# Patient Record
Sex: FEMALE | Race: WHITE | NOT HISPANIC OR LATINO | Employment: OTHER | URBAN - NONMETROPOLITAN AREA
[De-identification: names, ages, dates, MRNs, and addresses within clinical notes are randomized per-mention and may not be internally consistent; named-entity substitution may affect disease eponyms.]

---

## 2017-01-18 ENCOUNTER — OFFICE VISIT (OUTPATIENT)
Dept: GASTROENTEROLOGY | Facility: CLINIC | Age: 62
End: 2017-01-18

## 2017-01-18 VITALS
WEIGHT: 149 LBS | BODY MASS INDEX: 23.39 KG/M2 | DIASTOLIC BLOOD PRESSURE: 67 MMHG | HEIGHT: 67 IN | HEART RATE: 51 BPM | SYSTOLIC BLOOD PRESSURE: 108 MMHG

## 2017-01-18 DIAGNOSIS — K50.00 CROHN'S DISEASE OF SMALL INTESTINE WITHOUT COMPLICATION (HCC): Primary | ICD-10-CM

## 2017-01-18 DIAGNOSIS — Z12.11 ENCOUNTER FOR SCREENING FOR MALIGNANT NEOPLASM OF COLON: ICD-10-CM

## 2017-01-18 PROCEDURE — 99212 OFFICE O/P EST SF 10 MIN: CPT | Performed by: INTERNAL MEDICINE

## 2017-01-18 RX ORDER — GUAIFENESIN 600 MG/1
1200 TABLET, EXTENDED RELEASE ORAL
COMMUNITY
Start: 2016-12-23 | End: 2017-12-24

## 2017-01-18 RX ORDER — OMEPRAZOLE 40 MG/1
40 CAPSULE, DELAYED RELEASE ORAL
COMMUNITY

## 2017-01-18 RX ORDER — CETIRIZINE HYDROCHLORIDE 10 MG/1
TABLET ORAL
Refills: 3 | COMMUNITY
Start: 2016-12-27

## 2017-01-18 RX ORDER — ESTRADIOL AND NORETHINDRONE ACETATE 1; .5 MG/1; MG/1
1 TABLET ORAL
COMMUNITY

## 2017-01-18 RX ORDER — SOLIFENACIN SUCCINATE 5 MG/1
5 TABLET, FILM COATED ORAL
COMMUNITY

## 2017-01-18 NOTE — MR AVS SNAPSHOT
Darcie Booth Stepan   1/18/2017 10:15 AM   Office Visit    Dept Phone:  [phone]   Encounter #:  [id number]    Provider:  David Clark MD   Department:  [facility]                Your Full Care Plan              Your Updated Medication List          This list is accurate as of: 1/18/17 11:10 AM.  Always use your most recent med list.                azelastine 0.15 % solution nasal spray   Commonly known as:  ASTEPRO       buPROPion 100 MG tablet   Commonly known as:  WELLBUTRIN   Take 1 tablet by mouth 2 (Two) Times a Day.       calcium-vitamin D 500-200 MG-UNIT per tablet   Commonly known as:  OSCAL-500       cetirizine 10 MG tablet   Commonly known as:  zyrTEC       chlorpheniramine 4 MG tablet   Commonly known as:  CHLOR-TRIMETON       CLARITIN PO       estradiol-norethindrone 1-0.5 MG per tablet   Commonly known as:  ACTIVELLA       FOSAMAX 70 MG tablet   Generic drug:  alendronate       guaiFENesin 600 MG 12 hr tablet   Commonly known as:  MUCINEX       HYDROcodone-homatropine 5-1.5 MG/5ML syrup   Commonly known as:  HYCODAN       hydrOXYzine 25 MG tablet   Commonly known as:  ATARAX       omeprazole 40 MG capsule   Commonly known as:  priLOSEC       solifenacin 5 MG tablet   Commonly known as:  VESICARE               You Were Diagnosed With        Codes Comments    Encounter for screening for malignant neoplasm of colon    -  Primary ICD-10-CM: Z12.11  ICD-9-CM: V76.51       Instructions     None    Patient Instructions History      Upcoming Appointments     Visit Type Date Time Department    OFFICE VISIT 1/18/2017 10:15 AM [facility]  [facility]    [facility]  SCREEN BILAT 2/20/2017  9:00 AM [facility]      MyChart Signup     [facility] [email or web address] allows you to send messages to your doctor, view your test results, renew your prescriptions, schedule appointments, and more. To sign up, go to [employer] and click on the  "Sign Up Now link in the New User? box. Enter your [employer] Activation Code exactly as it appears below along with the last four digits of your Social Security Number and your Date of Birth () to complete the sign-up process. If you do not sign up before the expiration date, you must request a new code.    d[employer] Activation Code: [email or web address]  Expires: 2017 11:10 AM    If you have questions, you can email [email or web address] or call [phone] to talk to our [employer] staff. Remember, [employer] is NOT to be used for urgent needs. For medical emergencies, dial 911.               Other Info from Your Visit           Your Appointments     2017  9:00 AM CST   Mammo mad screen bilat with [facility] 1   [facility] ([place])    [address] [place] [address]   [phone]           Arrive 15 minutes prior to appointment time.              Allergies     Sulfa Antibiotics        Reason for Visit     Gastroesophageal Reflux Disease Without Esophagitis     Crohn's Disease           Vital Signs     Blood Pressure Pulse Height Weight Body Mass Index Smoking Status    108/67 (BP Location: Left arm, Patient Position: Sitting, Cuff Size: Adult) 51 67\" (170.2 cm) 149 lb (67.6 kg) 23.34 kg/m2 Former Smoker      Problems and Diagnoses Noted     Screen for colon cancer    -  Primary        "

## 2017-01-20 ENCOUNTER — TRANSCRIBE ORDERS (OUTPATIENT)
Dept: MAMMOGRAPHY | Facility: CLINIC | Age: 62
End: 2017-01-20

## 2017-01-20 DIAGNOSIS — Z12.31 VISIT FOR SCREENING MAMMOGRAM: Primary | ICD-10-CM

## 2017-02-07 RX ORDER — GLYCOPYRROLATE 1 MG/1
1 TABLET ORAL 2 TIMES DAILY
COMMUNITY

## 2017-02-16 ENCOUNTER — TELEPHONE (OUTPATIENT)
Dept: GASTROENTEROLOGY | Facility: CLINIC | Age: 62
End: 2017-02-16

## 2017-02-16 NOTE — TELEPHONE ENCOUNTER
02/16/2017, 1208 - Patient telephoned per this staff member [phone].  Zero answer.  Voice message submitted with date, time, office contact information, notification of Prep-O-Pik Bowel Preparation e-scribed to patient's pharmacy of choice, [place], [place], submission of instructions for usage of Prep-O-Pik via facsimile to [facility], and reminder of Colonoscopy date/time - Thursday, March 9, 2017 at 7:30 a.m.

## 2017-02-16 NOTE — TELEPHONE ENCOUNTER
----- Message from Halley Olson sent at 2/14/2017  9:43 AM CST -----  PT COULD NOT KEEP SUPREP DOWN PLEASE SEND SOMETHING ELSE TO CLINIC PHARMACY

## 2017-03-09 ENCOUNTER — ANESTHESIA (OUTPATIENT)
Dept: GASTROENTEROLOGY | Facility: HOSPITAL | Age: 62
End: 2017-03-09

## 2017-03-09 ENCOUNTER — ANESTHESIA EVENT (OUTPATIENT)
Dept: GASTROENTEROLOGY | Facility: HOSPITAL | Age: 62
End: 2017-03-09

## 2017-03-09 ENCOUNTER — HOSPITAL ENCOUNTER (OUTPATIENT)
Facility: HOSPITAL | Age: 62
Setting detail: HOSPITAL OUTPATIENT SURGERY
Discharge: HOME | End: 2017-03-09
Attending: INTERNAL MEDICINE | Admitting: INTERNAL MEDICINE

## 2017-03-09 VITALS
WEIGHT: 143 LBS | HEART RATE: 51 BPM | TEMPERATURE: 97.4 F | OXYGEN SATURATION: 96 % | HEIGHT: 67 IN | DIASTOLIC BLOOD PRESSURE: 56 MMHG | RESPIRATION RATE: 18 BRPM | BODY MASS INDEX: 22.44 KG/M2 | SYSTOLIC BLOOD PRESSURE: 103 MMHG

## 2017-03-09 PROCEDURE — 45378 DIAGNOSTIC COLONOSCOPY: CPT | Performed by: INTERNAL MEDICINE

## 2017-03-09 PROCEDURE — 25010000002 FENTANYL CITRATE (PF) 100 MCG/2ML SOLUTION: Performed by: NURSE ANESTHETIST, CERTIFIED REGISTERED

## 2017-03-09 PROCEDURE — 25010000002 PROPOFOL 10 MG/ML EMULSION: Performed by: NURSE ANESTHETIST, CERTIFIED REGISTERED

## 2017-03-09 PROCEDURE — 25010000002 MIDAZOLAM PER 1 MG: Performed by: NURSE ANESTHETIST, CERTIFIED REGISTERED

## 2017-03-09 RX ORDER — FENTANYL CITRATE 50 UG/ML
INJECTION, SOLUTION INTRAMUSCULAR; INTRAVENOUS AS NEEDED
Status: DISCONTINUED | OUTPATIENT
Start: 2017-03-09 | End: 2017-03-09 | Stop reason: SURG

## 2017-03-09 RX ORDER — DEXTROSE AND SODIUM CHLORIDE 5; .45 G/100ML; G/100ML
20 INJECTION, SOLUTION INTRAVENOUS CONTINUOUS
Status: DISCONTINUED | OUTPATIENT
Start: 2017-03-09 | End: 2017-03-09 | Stop reason: HOSPADM

## 2017-03-09 RX ORDER — PROPOFOL 10 MG/ML
VIAL (ML) INTRAVENOUS AS NEEDED
Status: DISCONTINUED | OUTPATIENT
Start: 2017-03-09 | End: 2017-03-09 | Stop reason: SURG

## 2017-03-09 RX ORDER — MIDAZOLAM HYDROCHLORIDE 1 MG/ML
INJECTION INTRAMUSCULAR; INTRAVENOUS AS NEEDED
Status: DISCONTINUED | OUTPATIENT
Start: 2017-03-09 | End: 2017-03-09 | Stop reason: SURG

## 2017-03-09 RX ADMIN — DEXTROSE AND SODIUM CHLORIDE 20 ML/HR: 5; 450 INJECTION, SOLUTION INTRAVENOUS at 07:47

## 2017-03-09 RX ADMIN — MIDAZOLAM 2 MG: 1 INJECTION INTRAMUSCULAR; INTRAVENOUS at 08:53

## 2017-03-09 RX ADMIN — FENTANYL CITRATE 100 MCG: 50 INJECTION, SOLUTION INTRAMUSCULAR; INTRAVENOUS at 08:53

## 2017-03-09 RX ADMIN — PROPOFOL 30 MG: 10 INJECTION, EMULSION INTRAVENOUS at 09:02

## 2017-03-09 RX ADMIN — PROPOFOL 30 MG: 10 INJECTION, EMULSION INTRAVENOUS at 08:55

## 2017-03-09 NOTE — ADDENDUM NOTE
Addendum  created 03/09/17 0915 by Arlene Kebede, CRNA    Anesthesia Event edited, Anesthesia Intra Flowsheets edited, Anesthesia Intra Meds edited, Anesthesia Review and Sign - Ready for Procedure, Anesthesia Review and Sign - Signed, Anesthesia Staff edited, Flowsheet data copied forward, Patient device added, Patient device removed, Procedure Event Log accessed, Sign clinical note

## 2017-03-09 NOTE — ANESTHESIA POSTPROCEDURE EVALUATION
Patient: Darcie Ying    Procedure Summary     Date Anesthesia Start Anesthesia Stop Room / Location    03/09/17 0852 0913 [facility] ENDOSCOPY 1 / [facility] ENDOSCOPY       Procedure Diagnosis Surgeon Provider    COLONOSCOPY (N/A ) Colon cancer screening  (SCREENING) MD Arlene Rojas CRNA          Anesthesia Type: MAC  Last vitals  BP      Temp      Pulse     Resp      SpO2        Post Anesthesia Care and Evaluation    Patient location during evaluation: bedside  Patient participation: complete - patient participated  Level of consciousness: awake and awake and alert  Pain management: adequate  Airway patency: patent  Anesthetic complications: No anesthetic complications  PONV Status: none  Cardiovascular status: acceptable  Respiratory status: acceptable  Hydration status: acceptable

## 2017-03-09 NOTE — ANESTHESIA PREPROCEDURE EVALUATION
Anesthesia Evaluation     Patient summary reviewed and Nursing notes reviewed      Airway   Mallampati: II  TM distance: >3 FB  Neck ROM: full  possible difficult intubation  Dental - normal exam     Pulmonary - normal exam   (+) pneumonia (Hospitalized with PNE in 2012, sees pulmonologist every 6 months, everything is good at this time) resolved ,   (-) COPD  Cardiovascular - negative cardio ROS and normal exam        Neuro/Psych- negative ROS  (-) numbness  GI/Hepatic/Renal/Endo    (+)  GERD well controlled,     ROS Comment: Chron's Disease    Musculoskeletal (-) negative ROS    Abdominal  - normal exam   Substance History - negative use     OB/GYN negative ob/gyn ROS   (-)  Pregnant        Other - negative ROS                                   Anesthesia Plan    ASA 2     MAC     intravenous induction   Anesthetic plan and risks discussed with patient.

## 2017-09-06 ENCOUNTER — OFFICE VISIT (OUTPATIENT)
Dept: GASTROENTEROLOGY | Facility: CLINIC | Age: 62
End: 2017-09-06

## 2017-09-06 VITALS
SYSTOLIC BLOOD PRESSURE: 110 MMHG | WEIGHT: 145.9 LBS | DIASTOLIC BLOOD PRESSURE: 70 MMHG | HEART RATE: 48 BPM | BODY MASS INDEX: 22.9 KG/M2 | HEIGHT: 67 IN

## 2017-09-06 DIAGNOSIS — K50.00 CROHN'S DISEASE OF SMALL INTESTINE WITHOUT COMPLICATION (HCC): Primary | ICD-10-CM

## 2017-09-06 DIAGNOSIS — K21.9 GASTROESOPHAGEAL REFLUX DISEASE WITHOUT ESOPHAGITIS: ICD-10-CM

## 2017-09-06 PROCEDURE — 99212 OFFICE O/P EST SF 10 MIN: CPT | Performed by: INTERNAL MEDICINE

## 2017-09-09 NOTE — PROGRESS NOTES
Chief Complaint   Patient presents with   • Crohn's Disease Of Small And Large Intestine Without Complic     Colonoscopy performed 03/09/2017        Darcie Ying is a  62 y.o. female here for a follow up visit for Crohn's disease, GERD    HPI :  The patient returns for follow-up.  Presently she is doing well from a GI standpoint.  Her bowel movements are generally once daily and are formed.  She denies rectal bleeding.  She has had no recent symptoms of iritis or skin manifestations.  Her last colonoscopy was done in March of this year that showed no evidence of active disease.    Reflux symptoms are currently controlled with use of omeprazole 40 mg daily.  Denies dysphagia.  Weight is stable.  The patient is aware of my pending skilled nursing.  She plans follow-up with Dr. Ratliff at the [facility] in [facility].    Past Medical History:   Diagnosis Date   • BOOP (bronchiolitis obliterans with organizing pneumonia)    • Carpal tunnel syndrome    • Crohn's disease    • Gastroesophageal reflux disease    • Long-term drug therapy        Current Outpatient Prescriptions   Medication Sig Dispense Refill   • alendronate (FOSAMAX) 70 MG tablet Take 70 mg by mouth Every 7 (Seven) Days.     • azelastine (ASTEPRO) 0.15 % solution nasal spray 1 spray into each nostril Daily As Needed.     • buPROPion (WELLBUTRIN) 100 MG tablet Take 1 tablet by mouth 2 (Two) Times a Day. 60 tablet 5   • calcium-vitamin D (OSCAL-500) 500-200 MG-UNIT per tablet Take 1 tablet by mouth 2 (Two) Times a Day.     • cetirizine (zyrTEC) 10 MG tablet   3   • chlorpheniramine (CHLOR-TRIMETON) 4 MG tablet Take 4 mg by mouth Every 6 (Six) Hours As Needed for allergies.     • estradiol-norethindrone (ACTIVELLA) 1-0.5 MG per tablet Take 1 tablet by mouth.     • glycopyrrolate (ROBINUL) 1 MG tablet Take 1 mg by mouth 2 (Two) Times a Day.     • guaiFENesin (MUCINEX) 600 MG 12 hr tablet Take 1,200 mg by mouth.     •  HYDROcodone-homatropine (HYCODAN) 5-1.5 MG/5ML syrup   0   • hydrOXYzine (ATARAX) 25 MG tablet Take 25 mg by mouth 2 (Two) Times a Day.     • omeprazole (priLOSEC) 40 MG capsule Take 40 mg by mouth.     • solifenacin (VESICARE) 5 MG tablet Take 5 mg by mouth.       No current facility-administered medications for this visit.        PRN Meds:.    Allergies   Allergen Reactions   • Sulfa Antibiotics        Social History     Social History   • Marital status:      Spouse name: N/A   • Number of children: N/A   • Years of education: N/A     Occupational History   • Not on file.     Social History Main Topics   • Smoking status: Former Smoker     Quit date: 10/2013   • Smokeless tobacco: Never Used      Comment: 09/06/2017 - Patient States She Ceased Smoking Approximately October 2013   • Alcohol use No   • Drug use: No   • Sexual activity: Defer      Comment:      Other Topics Concern   • Not on file     Social History Narrative       Family History   Problem Relation Age of Onset   • Breast cancer Other    • Brain cancer Other    • Other Other      colon problems   • Breast cancer Mother        Review of Systems   Constitutional: Negative for activity change, appetite change, chills, diaphoresis, fatigue, fever and unexpected weight change.   Gastrointestinal: Negative for abdominal distention, abdominal pain, anal bleeding, blood in stool, constipation, diarrhea, nausea, rectal pain and vomiting.   Psychiatric/Behavioral: Negative for confusion.       Vitals:    09/06/17 1114   BP: 110/70   Pulse: (!) 48       Physical Exam   Constitutional: Vital signs are normal. She appears well-developed and well-nourished.  Non-toxic appearance. She does not have a sickly appearance. She does not appear ill. No distress.   Cardiovascular: Regular rhythm, S1 normal, S2 normal and normal heart sounds.   No extrasystoles are present. PMI is not displaced.    No murmur heard.  Pulmonary/Chest: Breath sounds normal. No  tachypnea. No respiratory distress.   Abdominal: Soft. Normal appearance and bowel sounds are normal. She exhibits no shifting dullness, no distension, no fluid wave, no ascites and no mass. There is no hepatosplenomegaly, splenomegaly or hepatomegaly. There is no tenderness. No hernia.   Neurological: She is alert.   Nursing note and vitals reviewed.      ASSESSMENT AND PLAN      ICD-10-CM ICD-9-CM   1. Crohn's disease of small intestine without complication K50.00 555.0   2. Gastroesophageal reflux disease without esophagitis K21.9 530.81      Plan:  Continue current medical regimen  Follow-up with Dr. Ratliff, [facility]        This document has been electronically signed by David Clark MD on September 9, 2017 10:09 AM                                   “EMR Dragon/Transcription disclaimer:   Much of this encounter note is an electronic transcription/translation of spoken language to printed text. The electronic translation of spoken language may permit erroneous, or at times, nonsensical words or phrases to be inadvertently transcribed; Although I have reviewed the note for such errors, some may still exist.”

## 2020-02-12 ENCOUNTER — TRANSCRIBE ORDERS (OUTPATIENT)
Dept: GENERAL RADIOLOGY | Facility: CLINIC | Age: 65
End: 2020-02-12

## 2020-02-12 DIAGNOSIS — Z12.31 ENCOUNTER FOR SCREENING MAMMOGRAM FOR MALIGNANT NEOPLASM OF BREAST: Primary | ICD-10-CM

## 2020-02-12 DIAGNOSIS — N95.9 UNSPECIFIED MENOPAUSAL AND PERIMENOPAUSAL DISORDER: ICD-10-CM

## 2020-08-20 ENCOUNTER — TRANSCRIBE ORDERS (OUTPATIENT)
Dept: GENERAL RADIOLOGY | Facility: CLINIC | Age: 65
End: 2020-08-20

## 2020-08-20 DIAGNOSIS — M54.2 CERVICALGIA: Primary | ICD-10-CM

## 2021-07-23 ENCOUNTER — TRANSCRIBE ORDERS (OUTPATIENT)
Dept: CT IMAGING | Facility: CLINIC | Age: 66
End: 2021-07-23

## 2021-07-23 DIAGNOSIS — Z12.2 ENCOUNTER FOR SCREENING FOR MALIGNANT NEOPLASM OF RESPIRATORY ORGANS: ICD-10-CM

## 2021-07-23 DIAGNOSIS — Z12.31 ENCOUNTER FOR SCREENING MAMMOGRAM FOR MALIGNANT NEOPLASM OF BREAST: Primary | ICD-10-CM

## 2022-05-25 ENCOUNTER — NURSE NAVIGATOR (OUTPATIENT)
Dept: ONCOLOGY | Facility: CLINIC | Age: 67
End: 2022-05-25

## 2022-05-25 NOTE — PROGRESS NOTES
LUNG PATIENT NAVIGATION    LUNG SCREENING PROGRAM - Patient Outreach    Letter sent to patient to notify of overdue follow-up scan as recommended on last LDCT scan.   s/p several outreach efforts to ordering provider with no documented follow-up order or scan at this facility or in Care Everywhere.

## 2022-08-01 ENCOUNTER — TRANSCRIBE ORDERS (OUTPATIENT)
Dept: LAB | Facility: OTHER | Age: 67
End: 2022-08-01

## 2022-08-01 ENCOUNTER — TRANSCRIBE ORDERS (OUTPATIENT)
Dept: MAMMOGRAPHY | Facility: CLINIC | Age: 67
End: 2022-08-01

## 2022-08-01 DIAGNOSIS — Z12.31 ENCOUNTER FOR SCREENING MAMMOGRAM FOR MALIGNANT NEOPLASM OF BREAST: Primary | ICD-10-CM

## 2023-03-09 ENCOUNTER — TRANSCRIBE ORDERS (OUTPATIENT)
Dept: BONE DENSITY | Facility: CLINIC | Age: 68
End: 2023-03-09
Payer: COMMERCIAL

## 2023-03-09 DIAGNOSIS — N95.1 MENOPAUSAL AND FEMALE CLIMACTERIC STATES: Primary | ICD-10-CM

## 2023-08-07 ENCOUNTER — TRANSCRIBE ORDERS (OUTPATIENT)
Dept: GENERAL RADIOLOGY | Facility: CLINIC | Age: 68
End: 2023-08-07
Payer: COMMERCIAL

## 2023-08-07 DIAGNOSIS — Z12.31 ENCOUNTER FOR SCREENING MAMMOGRAM FOR MALIGNANT NEOPLASM OF BREAST: Primary | ICD-10-CM

## (undated) DEVICE — CANN SMPL SOFTECH BIFLO ETCO2 A/M 7FT